# Patient Record
Sex: FEMALE | Race: WHITE
[De-identification: names, ages, dates, MRNs, and addresses within clinical notes are randomized per-mention and may not be internally consistent; named-entity substitution may affect disease eponyms.]

---

## 2018-11-09 ENCOUNTER — HOSPITAL ENCOUNTER (OUTPATIENT)
Dept: HOSPITAL 92 - SCSRAD | Age: 63
Discharge: HOME | End: 2018-11-09
Attending: INTERNAL MEDICINE
Payer: OTHER GOVERNMENT

## 2018-11-09 ENCOUNTER — HOSPITAL ENCOUNTER (INPATIENT)
Dept: HOSPITAL 92 - SCSER | Age: 63
LOS: 3 days | Discharge: HOME | DRG: 871 | End: 2018-11-12
Attending: FAMILY MEDICINE | Admitting: FAMILY MEDICINE
Payer: OTHER GOVERNMENT

## 2018-11-09 VITALS — BODY MASS INDEX: 28.3 KG/M2

## 2018-11-09 DIAGNOSIS — E11.9: ICD-10-CM

## 2018-11-09 DIAGNOSIS — Z79.52: ICD-10-CM

## 2018-11-09 DIAGNOSIS — E78.2: ICD-10-CM

## 2018-11-09 DIAGNOSIS — J98.4: ICD-10-CM

## 2018-11-09 DIAGNOSIS — E78.5: ICD-10-CM

## 2018-11-09 DIAGNOSIS — R05: Primary | ICD-10-CM

## 2018-11-09 DIAGNOSIS — D69.6: ICD-10-CM

## 2018-11-09 DIAGNOSIS — M06.9: ICD-10-CM

## 2018-11-09 DIAGNOSIS — J20.9: ICD-10-CM

## 2018-11-09 DIAGNOSIS — R65.20: ICD-10-CM

## 2018-11-09 DIAGNOSIS — H35.30: ICD-10-CM

## 2018-11-09 DIAGNOSIS — Z79.82: ICD-10-CM

## 2018-11-09 DIAGNOSIS — Z88.1: ICD-10-CM

## 2018-11-09 DIAGNOSIS — Z88.8: ICD-10-CM

## 2018-11-09 DIAGNOSIS — Z79.84: ICD-10-CM

## 2018-11-09 DIAGNOSIS — J96.01: ICD-10-CM

## 2018-11-09 DIAGNOSIS — Z79.899: ICD-10-CM

## 2018-11-09 DIAGNOSIS — A40.9: Primary | ICD-10-CM

## 2018-11-09 DIAGNOSIS — J15.4: ICD-10-CM

## 2018-11-09 DIAGNOSIS — Z87.891: ICD-10-CM

## 2018-11-09 DIAGNOSIS — Z79.51: ICD-10-CM

## 2018-11-09 LAB
ALBUMIN SERPL BCG-MCNC: 3.8 G/DL (ref 3.4–4.8)
ALP SERPL-CCNC: 54 U/L (ref 40–150)
ALT SERPL W P-5'-P-CCNC: 28 U/L (ref 8–55)
ANALYZER IN CARDIO: (no result)
ANION GAP SERPL CALC-SCNC: 17 MMOL/L (ref 10–20)
ANISOCYTOSIS BLD QL SMEAR: (no result) (100X)
AST SERPL-CCNC: 36 U/L (ref 5–34)
BASE EXCESS STD BLDA CALC-SCNC: -4.5 MEQ/L
BILIRUB SERPL-MCNC: 0.7 MG/DL (ref 0.2–1.2)
BUN SERPL-MCNC: 14 MG/DL (ref 9.8–20.1)
CA-I BLDA-SCNC: 1.21 MMOL/L (ref 1.12–1.3)
CALCIUM SERPL-MCNC: 9.9 MG/DL (ref 7.8–10.44)
CHLORIDE SERPL-SCNC: 100 MMOL/L (ref 98–107)
CO2 SERPL-SCNC: 19 MMOL/L (ref 23–31)
CREAT CL PREDICTED SERPL C-G-VRATE: 0 ML/MIN (ref 70–130)
GLOBULIN SER CALC-MCNC: 3.2 G/DL (ref 2.4–3.5)
GLUCOSE SERPL-MCNC: 175 MG/DL (ref 80–115)
HCO3 BLDA-SCNC: 19.8 MEQ/L (ref 22–28)
HCT VFR BLDA CALC: 40 % (ref 36–47)
HGB BLD-MCNC: 13.9 G/DL (ref 12–16)
HGB BLDA-MCNC: 13.5 G/DL (ref 12–16)
MCH RBC QN AUTO: 31 PG (ref 27–31)
MCV RBC AUTO: 91.4 FL (ref 78–98)
MDIFF COMPLETE?: YES
PCO2 BLDA: 34.4 MMHG (ref 35–45)
PH BLDA: 7.38 [PH] (ref 7.35–7.45)
PLATELET # BLD AUTO: 94 THOU/UL (ref 130–400)
PLATELET BLD QL SMEAR: (no result)
PO2 BLDA: 69.5 MMHG (ref 80–?)
POTASSIUM BLD-SCNC: 4.33 MMOL/L (ref 3.7–5.3)
POTASSIUM SERPL-SCNC: 4.4 MMOL/L (ref 3.5–5.1)
RBC # BLD AUTO: 4.49 MILL/UL (ref 4.2–5.4)
REFLEX FOR REVIEW??: YES
SODIUM SERPL-SCNC: 132 MMOL/L (ref 136–145)
SPECIMEN DRAWN FROM PATIENT: (no result)
WBC # BLD AUTO: 8 THOU/UL (ref 4.8–10.8)

## 2018-11-09 PROCEDURE — A4216 STERILE WATER/SALINE, 10 ML: HCPCS

## 2018-11-09 PROCEDURE — 36416 COLLJ CAPILLARY BLOOD SPEC: CPT

## 2018-11-09 PROCEDURE — 94660 CPAP INITIATION&MGMT: CPT

## 2018-11-09 PROCEDURE — 85025 COMPLETE CBC W/AUTO DIFF WBC: CPT

## 2018-11-09 PROCEDURE — 83880 ASSAY OF NATRIURETIC PEPTIDE: CPT

## 2018-11-09 PROCEDURE — 80053 COMPREHEN METABOLIC PANEL: CPT

## 2018-11-09 PROCEDURE — 94760 N-INVAS EAR/PLS OXIMETRY 1: CPT

## 2018-11-09 PROCEDURE — 85060 BLOOD SMEAR INTERPRETATION: CPT

## 2018-11-09 PROCEDURE — 71045 X-RAY EXAM CHEST 1 VIEW: CPT

## 2018-11-09 PROCEDURE — 5A09357 ASSISTANCE WITH RESPIRATORY VENTILATION, LESS THAN 24 CONSECUTIVE HOURS, CONTINUOUS POSITIVE AIRWAY PRESSURE: ICD-10-PCS | Performed by: FAMILY MEDICINE

## 2018-11-09 PROCEDURE — 96361 HYDRATE IV INFUSION ADD-ON: CPT

## 2018-11-09 PROCEDURE — 96375 TX/PRO/DX INJ NEW DRUG ADDON: CPT

## 2018-11-09 PROCEDURE — 82805 BLOOD GASES W/O2 SATURATION: CPT

## 2018-11-09 PROCEDURE — 87040 BLOOD CULTURE FOR BACTERIA: CPT

## 2018-11-09 PROCEDURE — 83605 ASSAY OF LACTIC ACID: CPT

## 2018-11-09 PROCEDURE — 96367 TX/PROPH/DG ADDL SEQ IV INF: CPT

## 2018-11-09 PROCEDURE — 87633 RESP VIRUS 12-25 TARGETS: CPT

## 2018-11-09 PROCEDURE — 71046 X-RAY EXAM CHEST 2 VIEWS: CPT

## 2018-11-09 PROCEDURE — 36415 COLL VENOUS BLD VENIPUNCTURE: CPT

## 2018-11-09 PROCEDURE — 93005 ELECTROCARDIOGRAM TRACING: CPT

## 2018-11-09 PROCEDURE — 94640 AIRWAY INHALATION TREATMENT: CPT

## 2018-11-09 PROCEDURE — 96365 THER/PROPH/DIAG IV INF INIT: CPT

## 2018-11-09 PROCEDURE — 80048 BASIC METABOLIC PNL TOTAL CA: CPT

## 2018-11-09 NOTE — HP
DATE OF ADMISSION:  11/09/2018

 

PRIMARY CARE PROVIDER:  Liliam Choi M.D.

 

CHIEF COMPLAINT:  Shortness of breath.

 

HISTORY OF PRESENT ILLNESS:  Ms. Byrne is a pleasant 63-year-old lady who was seen at [facility] on 11/09/2018 following transfer from [facility] emergency room.

 

She reports that she developed shortness of breath for the last 3 days.  The shortness of breath is a
ssociated with cough, congestion and temperature of 101 degrees Fahrenheit.  She also reports having 
headache.  She reports that the cough started being productive as of yesterday, but she is unable to 
bring the sputum up.  She saw her primary care provider 3 days ago and was started on Augmentin.  She
 continued to have symptoms and therefore had chest x-ray earlier today.  The chest x-ray was consist
ent with multifocal pneumonia.  Therefore, she presented to the emergency room.  She reports that she
 had a flu vaccine this year.  She also had a negative flu test when she was initially seen by her [facility] care provider.

 

REVIEW OF SYSTEMS:  All other systems reviewed and found to be negative.

 

PAST MEDICAL HISTORY:  Diabetes mellitus type 2, macular degeneration, dyslipidemia, hypertension and
 rheumatoid arthritis.

 

PAST SURGICAL HISTORY:  Bilateral cataract surgery, bladder incontinence repair and right knee surger
y.

 

SOCIAL HISTORY:  The patient quit smoking 13 years ago.  She denies any alcohol use or recreational d
rug use.

 

FAMILY HISTORY:  Significant for coronary artery disease in her brother.

 

ALLERGIES:  No known drug allergies.

 

CURRENT MEDICATIONS:  The patient reports that she was advised to stop methotrexate and Enbrel when h
er symptoms started.  She is currently taking vitamin C 500 mg daily, aspirin 81 mg daily, Coreg 3.25
 mg 2 times a day, cetirizine 10 mg daily, vitamin D3 1000 units daily, fish oil 4 capsules daily, fo
lic acid 1 mg daily, lisinopril 10 mg daily, metformin 1000 mg 2 times a day and prednisone 7.5 mg 2 
times a day, she is currently on a prednisone taper.

 

PHYSICAL EXAMINATION:

GENERAL:  Ms. Byrne is awake and alert, not in acute distress.

VITAL SIGNS:  Blood pressure is 147/68, pulse is 85, respiratory rate 32 and oxygen saturation 98% on
 BiPAP.  She is afebrile.

EYES:  No scleral icterus.  No conjunctival pallor.

ENT:  Moist mucosal membranes.  No oropharyngeal erythema or exudate.

NECK:  Supple, nontender.  Trachea is midline.

RESPIRATORY:  Accessory muscles of breathing are active.  Chest wall movements are symmetric bilatera
lly.  Lungs examination reveals bilateral crackles.

CARDIOVASCULAR:  S1 and S2 are heard, regular.  Peripheral pulses palpable.  No carotid bruit, no per
icardial rub.

ABDOMEN:  Soft, nontender, bowel sounds are heard.  No hepatomegaly, no splenomegaly.

NEUROLOGIC:  Cranial nerves II-XII intact.  Deep tendon reflexes 2+.

MUSCULOSKELETAL:  Power is 5/5 in all 4 extremities.  She has rheumatoid changes in her fingers.

SKIN:  No rashes or subcutaneous nodules.

LYMPHATIC:  No cervical lymphadenopathy.

 

LABORATORY DATA:  Ms. Byrne's labs and investigations were reviewed.  I reviewed her electrocardiogra
m, which shows normal sinus rhythm, no ST changes to suggest an acute coronary syndrome.  I also revi
ewed her chest x-ray, which shows multifocal infiltrates.  She has a normal white count of 8000, but 
bandemia with 22% bands and 38% neutrophils.  Hemoglobin is normal.  She has thrombocytopenia with a 
platelet count of 94,000, it was 130,000 on 10/03/2018.  She is hyponatremic, with sodium of 132, nor
mal potassium, normal creatinine, mildly elevated AST of 36, otherwise unremarkable liver profile.  L
actic acid initially elevated at 2.6 and subsequently trending down into the normal range at 1.5.

 

ASSESSMENT AND PLAN:  Ms. Byrne is a pleasant 63-year-old lady who was seen at [facility] on 11/09/2018.  Her problem list includes:

1.  Acute hypoxic respiratory failure:  Ms. Byrne was initially evaluated at [facility]
ergency room for acute hypoxic respiratory failure.  There, she became hypoxic after receiving steroi
ds and nebulizers.  She is currently on BiPAP therapy in the IMCU.  She has slightly improved since c
oming to the IM.

2.  Sepsis:  The patient's presentation meets the criteria for sepsis, suspected source of infection 
in the lung.  She will be treated with intravenous antibiotics in the IMCU setting.

3.  Pneumonia:  The patient is an immunocompromised patient presenting with multifocal pneumonia.  Sh
e will be started on vancomycin, levofloxacin and cefepime.  We will await blood cultures.

4.  Diabetes mellitus type 2:  We will start Accu-Cheks and insulin sliding scale.

5.  Hypertension:  We will monitor vital signs and titrate antihypertensives as needed.

6.  Rheumatoid arthritis:  Enbrel and methotrexate are on hold.  We will continue prednisone.  If blo
od pressure drops, we will consider stress dose steroids.

7.  Dyslipidemia:  We will continue fish oil.

 

Many thanks for allowing me to participate in your patient's care.  Please feel free to contact me wi
th any questions or concerns.

 

LEVEL OF RISK:  High.

 

LEVEL OF COMPLEXITY:  High.

## 2018-11-09 NOTE — RAD
CHEST 2 VIEWS:

 

Date:  11/09/18 

 

HISTORY:  

Cough. 

 

COMPARISON:  

Chest radiograph from 2012. 

 

FINDINGS:

There are extensive air space opacities throughout the lungs bilaterally. No pneumothorax. No signifi
cant effusion. Heart size upper limits of normal. 

 

IMPRESSION: 

Extensive air space opacities throughout the lungs concerning for multifocal infection. Atypical infe
ctious process is a possibility versus ARDS/hemorrhage. 

 

 

POS: [facility]

## 2018-11-10 LAB
ANION GAP SERPL CALC-SCNC: 12 MMOL/L (ref 10–20)
BUN SERPL-MCNC: 14 MG/DL (ref 9.8–20.1)
CALCIUM SERPL-MCNC: 9.6 MG/DL (ref 7.8–10.44)
CHLORIDE SERPL-SCNC: 105 MMOL/L (ref 98–107)
CO2 SERPL-SCNC: 24 MMOL/L (ref 23–31)
CREAT CL PREDICTED SERPL C-G-VRATE: 87 ML/MIN (ref 70–130)
GLUCOSE SERPL-MCNC: 244 MG/DL (ref 80–115)
HGB BLD-MCNC: 13.2 G/DL (ref 12–16)
MCH RBC QN AUTO: 32 PG (ref 27–31)
MCV RBC AUTO: 96.2 FL (ref 78–98)
MDIFF COMPLETE?: YES
PLATELET # BLD AUTO: 99 THOU/UL (ref 130–400)
PLATELET BLD QL SMEAR: (no result)
POTASSIUM SERPL-SCNC: 4.2 MMOL/L (ref 3.5–5.1)
RBC # BLD AUTO: 4.12 MILL/UL (ref 4.2–5.4)
SODIUM SERPL-SCNC: 137 MMOL/L (ref 136–145)
WBC # BLD AUTO: 4 THOU/UL (ref 4.8–10.8)

## 2018-11-10 RX ADMIN — Medication SCH MG: at 08:10

## 2018-11-10 RX ADMIN — VANCOMYCIN HYDROCHLORIDE SCH MLS: 1 INJECTION, SOLUTION INTRAVENOUS at 10:33

## 2018-11-10 RX ADMIN — INSULIN LISPRO PRN UNIT: 100 INJECTION, SOLUTION INTRAVENOUS; SUBCUTANEOUS at 20:45

## 2018-11-10 RX ADMIN — INSULIN LISPRO PRN UNIT: 100 INJECTION, SOLUTION INTRAVENOUS; SUBCUTANEOUS at 16:51

## 2018-11-10 RX ADMIN — ASPIRIN SCH MG: 81 TABLET ORAL at 08:09

## 2018-11-10 RX ADMIN — VANCOMYCIN HYDROCHLORIDE SCH MLS: 1 INJECTION, SOLUTION INTRAVENOUS at 00:26

## 2018-11-10 NOTE — PDOC.PN
- Subjective


Encounter Start Date: 11/10/18


Encounter Start Time: 08:50


-: old records requested/rev





pt feels better, she is off bipap since 8 AM, maintaining saturation with oxygen

, 


Patient seen and examined. No new complaints. No overnight events





- Objective


MAR Reviewed: Yes


Vital Signs & Weight: 


 Vital Signs (12 hours)











  Temp Pulse Resp BP Pulse Ox


 


 11/10/18 07:40      95


 


 11/10/18 07:13      95


 


 11/10/18 07:00  97.8 F  89  35 H  129/53 L  96


 


 11/10/18 04:29    22 H  


 


 11/10/18 03:00  97.4 F L  84  22 H  139/61  99


 


 11/10/18 02:35    24 H   96


 


 11/09/18 23:46  97.4 F L  85  26 H  140/55 L  97


 


 11/09/18 23:25   91  34 H   95


 


 11/09/18 23:10    35 H   98








 Weight











Weight                         149 lb 9.6 oz














I&O: 


 











 11/09/18 11/10/18 11/11/18





 06:59 06:59 06:59


 


Intake Total  650 


 


Output Total  2320 


 


Balance  -1670 











Result Diagrams: 


 11/10/18 03:22





 11/10/18 03:22


Additional Labs: 


 Accuchecks











  11/10/18





  06:16


 


POC Glucose  174 H











Radiology Reviewed by me: Yes (chest xray reviewed)


EKG Reviewed by me: Yes (nsr)





Phys Exam





- Physical Examination


Constitutional: NAD


HEENT: PERRLA, moist MMs, sclera anicteric


Neck: no JVD, supple


Respiratory: no wheezing, no rhonchi


scattered rales+


Cardiovascular: RRR, no significant murmur, no rub


Gastrointestinal: soft, non-tender, no distention, positive bowel sounds


Musculoskeletal: no edema, pulses present


Neurological: non-focal, normal sensation, moves all 4 limbs


Psychiatric: normal affect, A&O x 3


Skin: no rash, normal turgor





Dx/Plan


(1) Acute respiratory failure with hypoxia


Code(s): J96.01 - ACUTE RESPIRATORY FAILURE WITH HYPOXIA   Status: Acute   





(2) Lactic acidosis


Code(s): E87.2 - ACIDOSIS   Status: Acute   Comment: due to sepsis   





(3) Multifocal pneumonia


Code(s): J18.9 - PNEUMONIA, UNSPECIFIED ORGANISM   Status: Acute   Comment: 

suspecting GPC and GNR   





(4) Sepsis with acute organ dysfunction


Code(s): A41.9 - SEPSIS, UNSPECIFIED ORGANISM; R65.20 - SEVERE SEPSIS WITHOUT 

SEPTIC SHOCK   Status: Acute   





(5) Thrombocytopenia


Code(s): D69.6 - THROMBOCYTOPENIA, UNSPECIFIED   Status: Acute   





(6) Arthritis, rheumatoid


Code(s): M06.9 - RHEUMATOID ARTHRITIS, UNSPECIFIED   Status: Chronic   





(7) Diabetes type 2, controlled


Code(s): E11.9 - TYPE 2 DIABETES MELLITUS WITHOUT COMPLICATIONS   Status: 

Chronic   





(8) Hypertension


Code(s): I10 - ESSENTIAL (PRIMARY) HYPERTENSION   Status: Chronic   





(9) Immunosuppressed status


Code(s): D89.9 - DISORDER INVOLVING THE IMMUNE MECHANISM, UNSPECIFIED   Status: 

Chronic   





- Plan


cont current plan of care, plan discussed w/ family, continue antibiotics, 

respiratory therapy





* continue Bipap as needed


* continue vancomycin, cefepime and levaquin


* home medication reconciled


* medication reviewed as below


* symptomatic treatment.


* monitor in IMCU


* discussed with family


* repeat labs tomorrow


* pulmonary on case








Review of Systems





- Review of Systems


Constitutional: weakness.  negative: fever, chills, sweats, malaise, other


Respiratory: Cough, Shortness of Breath.  negative: Dry, Hemoptysis, SOB with 

Excertion, Pleuritic Pain, Sputum, Wheezing


Cardiovascular: negative: chest pain, palpitations, orthopnea, paroxysmal 

nocturnal dyspnea, edema, light headedness, other


Gastrointestinal: negative: Nausea, Vomiting, Abdominal Pain, Diarrhea, 

Constipation, Melena, Hematochezia, Other


Genitourinary: negative: Dysuria, Frequency, Incontinence, Hematuria, Retention

, Other


Musculoskeletal: negative: Neck Pain, Shoulder Pain, Arm Pain, Back Pain, Hand 

Pain, Leg Pain, Foot Pain, Other


Skin: negative: Rash, Lesions, Efrain, Bruising, Other





- Medications/Allergies


Allergies/Adverse Reactions: 


 Allergies











Allergy/AdvReac Type Severity Reaction Status Date / Time


 


No Known Allergies Allergy   Verified 11/09/18 21:04











Medications: 


 Current Medications





Acetaminophen (Tylenol)  650 mg PO Q4H PRN


   PRN Reason: Headache/Fever/Mild Pain (1-3)


Albuterol/Ipratropium (Duoneb)  3 ml NEB Q6H PRN


   PRN Reason: SOB &/or Wheezing


Artificial Tears (Tears Naturale)  2 drop EA EYE PRN PRN


   PRN Reason: Dry Eyes


Ascorbic Acid (Vitamin C)  500 mg PO DAILY [facility]


   Last Admin: 11/10/18 08:10 Dose:  500 mg


Aspirin (Ecotrin)  81 mg PO DAILY [facility]


   Last Admin: 11/10/18 08:09 Dose:  81 mg


Bisacodyl (Dulcolax)  10 mg PO DAILYPRN PRN


   PRN Reason: Constipation


Carvedilol (Coreg)  3.125 mg PO BID-[facility]


   Last Admin: 11/10/18 08:09 Dose:  3.125 mg


Cholecalciferol (Vitamin D3)  1,000 units PO DAILY [facility]


   Last Admin: 11/10/18 08:10 Dose:  1,000 units


Dextrose/Water (Dextrose 50%)  25 gm SLOW IVP PRN PRN


   PRN Reason: Hypoglycemia


Enoxaparin Sodium (Lovenox)  40 mg SC 0900 [facility]


   Last Admin: 11/10/18 08:08 Dose:  40 mg


Fish Oil (Fish Oil)  4,000 mg PO QAM-[facility]


   Last Admin: 11/10/18 08:13 Dose:  Not Given


Folic Acid (Folvite)  1 mg PO DAILY [facility]


   Last Admin: 11/10/18 08:11 Dose:  1 mg


Glucagon (Glucagon)  1 mg IM PRN PRN


   PRN Reason: Hypoglycemia


Guaifenesin (Robitussin Sf)  200 mg PO Q4H PRN


   PRN Reason: Cough


Hydralazine HCl (Apresoline)  10 mg SLOW IVP Q4H PRN


   PRN Reason: SBP > 180 and HR < 70


Cefepime HCl 2 gm/ Sodium (Chloride)  100 mls @ 200 mls/hr IVPB Q12HR [facility]


   Last Admin: 11/10/18 08:08 Dose:  100 mls


Levofloxacin 750 mg/ Device  150 mls @ 100 mls/hr IVPB Q24HR [facility]


   Last Admin: 11/09/18 23:07 Dose:  150 mls


Dextrose/Water (D5w)  1,000 mls @ 0 mls/hr IV .Q0M PRN


   PRN Reason: Hypoglycemia


Vancomycin HCl 1 gm/ Device  200 mls @ 200 mls/hr IVPB 1100,2300 [facility]


   Last Admin: 11/10/18 00:26 Dose:  200 mls


Insulin Human Lispro (Humalog)  0 units SC .MILD SLIDING SCALE PRN


   PRN Reason: Mild Correctional Scale


Lisinopril (Zestril)  10 mg PO DAILY [facility]


   Last Admin: 11/10/18 08:09 Dose:  10 mg


Loperamide HCl (Imodium)  2 mg PO PRN PRN


   PRN Reason: Diarrhea/Loose Stools


Loratadine (Claritin)  10 mg PO DAILY [facility]


   Last Admin: 11/10/18 08:10 Dose:  10 mg


Metformin HCl (Glucophage)  1,000 mg PO BID-[facility]


   Last Admin: 11/10/18 08:11 Dose:  1,000 mg


Metoclopramide HCl (Reglan)  5 mg IVP Q4H PRN


   PRN Reason: Nausea


Mineral Oil/White Petrolatum (Eucerin Cream)  0 gm TOP BIDPRN PRN


   PRN Reason: Dry Skin


Miscellaneous Medication (Pharmacy To Dose)  1 each IVPB ONE PRN


   PRN Reason: Pharmacy to dose


   Stop: 11/19/18 22:06


Prednisone (Prednisone)  7.5 mg PO BID-[facility]


   Last Admin: 11/10/18 08:10 Dose:  7.5 mg


Senna/Docusate Sodium (Senokot S)  2 tab PO BID PRN


   PRN Reason: Constipation


Sodium Chloride (Ocean Nasal Spray 0.65%)  0 ml EA NARE QIDPRN PRN


   PRN Reason: Nasal Congestion


Throat Lozenges (Cepastat Lozenges)  1 piyush PO Q2H PRN


   PRN Reason: Sore Throat

## 2018-11-10 NOTE — CON
DATE OF CONSULTATION:  11/10/2018

 

SERVICE:  Pulmonary Medicine.

 

REASON FOR CONSULTATION:  Respiratory failure.

 

HISTORY OF PRESENT ILLNESS:  The patient is a 63-year-old white female with no 
past medical history that is significant.  She was in her usual state of health 
until Tuesday.  She started having symptoms consistent with sinusitis.  She got 
seen by her primary care physician, who put her on some Augmentin.  That being 
said, over the next 3 days, she had increasing dyspnea that limited her 
activity.  She presented to the emergency department, because she could not 
breathe.  Ultimately, she was discovered to have findings that could be 
consistent with pneumonia.  She had cough, sputum production, fevers, chills, 
myalgia, malaise.  She did not have any nausea, vomiting, or diarrhea.  She has 
no hot, red, swollen joints, rashes, dysuria, or frequency.  Otherwise, she is 
in her usual state of health.  Over the last 24 hours, her breathing has opened 
up very dramatically.

 

PAST MEDICAL HISTORY:

1.  Rheumatoid arthritis, on disease modifying therapy.

2.  Type 2 diabetes mellitus.

3.  Macular degeneration.

4.  Dyslipidemia.

5.  Hypertension.

 

PAST SURGICAL HISTORY:

1.  Bladder suspension surgery.

2.  Right knee surgery.

3.  Bilateral cataract surgery.

 

SOCIAL HISTORY:  She quit smoking 13 years ago, prior to that had a 30-pack-
year history of smoking.  Denies any alcohol or illicit drug use.  She has no 
exposure to chemicals, dust, asbestos, or tuberculosis.

 

FAMILY HISTORY:  Noncontributory.

 

ALLERGIES:  No known drug allergies.

 

MEDICATIONS:  List of her inpatient medications were reviewed and heavily 
modified.

 

REVIEW OF SYSTEMS:  General, head, ears, eyes, nose, throat, cardiovascular, 
respiratory, GI, , musculoskeletal, neurologic, and skin is negative except 
as mentioned in the HPI.

 

PHYSICAL EXAMINATION:

VITAL SIGNS:  Afebrile in our location.  Apparently, at the outside emergency 
department, she had a temperature of 102.  Pulse 91, blood pressure 130/62, 
respirations 18, saturation 93% on 4 liters nasal cannula.

GENERAL:  Patient is awake, alert, in no apparent distress.

LUNGS:  Excellent air entry.  There are asymmetric crackles present.  There is 
no prolonged expiratory phase, wheezing, or rhonchi appreciated.

HEART:  Normal rate, regular.

ABDOMEN:  Soft, nontender, nondistended.  Bowel sounds are positive.

MUSCULOSKELETAL:  No cyanosis or clubbing.  No pitting in the bilateral lower 
extremities.

NEUROLOGIC:  Grossly nonfocal.

 

LABORATORY DATA:  WBC 4.0, hemoglobin 13.2, platelets 99,000.  Her band count 
was originally 22%, but has improved to 5%.  PH 7.38, pCO2 of 34, pO2 of 69.  
This corresponds to a saturation of 93% when she was wearing BiPAP with 70% 
FiO2.  Basic metabolic profile and liver function studies are essentially 
unremarkable.  BNP 85.  Lactate is negative.  Blood cultures x2 are 
unremarkable.

 

IMAGING:  Chest x-ray demonstrates pulmonary edema spread throughout bilateral 
lung fields.  A widened carinal angle is present.  That being said, there seems 
to be sparing of the bibasilar regions.  This is consistent with a 
noncardiogenic form of pulmonary edema.  Interstitial fullness is prominent.

 

ASSESSMENT:

1.  Acute hypoxic respiratory failure.

2.  Acute bronchitis.

3.  Community-acquired pneumonia.

4.  Rheumatoid arthritis, on disease modifying, immune suppressing medications.

 

DISCUSSION AND PLAN:  We will continue our antibiotics, nebulized medications, 
and steroids.  We will back off on her dose of steroids to 40 mg p.o. daily.  
She has been off of BiPAP for 12 hours now.  As such, we will transition her 
out of the ICU to the medical unit.  I will send a respiratory virus panel to 
see whether or not this is the source of our noncardiogenic pulmonary edema 
pattern on chest x-ray.  In 2 days, repeat chest x-ray will be performed.  Of 
note, hemoglobin has been stable.

 

70 minutes have been devoted to this patient in various activities.  I 
personally reviewed all imaging studies and laboratory data noted within this 
document.  For fifty percent of this time, I was interacting with the patient 
at the bedside or coordinating care with the care team.  For the remainder of 
the time I was immediately available to the patient in the hospital unit.  



YANIRA

## 2018-11-11 LAB
ALBUMIN SERPL BCG-MCNC: 3.5 G/DL (ref 3.4–4.8)
ALP SERPL-CCNC: 54 U/L (ref 40–150)
ALT SERPL W P-5'-P-CCNC: 19 U/L (ref 8–55)
ANION GAP SERPL CALC-SCNC: 13 MMOL/L (ref 10–20)
AST SERPL-CCNC: 51 U/L (ref 5–34)
BILIRUB SERPL-MCNC: 0.5 MG/DL (ref 0.2–1.2)
BUN SERPL-MCNC: 15 MG/DL (ref 9.8–20.1)
CALCIUM SERPL-MCNC: 10 MG/DL (ref 7.8–10.44)
CHLORIDE SERPL-SCNC: 104 MMOL/L (ref 98–107)
CO2 SERPL-SCNC: 24 MMOL/L (ref 23–31)
CREAT CL PREDICTED SERPL C-G-VRATE: 92 ML/MIN (ref 70–130)
GLOBULIN SER CALC-MCNC: 2.8 G/DL (ref 2.4–3.5)
GLUCOSE SERPL-MCNC: 127 MG/DL (ref 80–115)
HGB BLD-MCNC: 12.3 G/DL (ref 12–16)
MANUAL DIF COMMENT BLD-IMP: (no result)
MCH RBC QN AUTO: 32 PG (ref 27–31)
MCV RBC AUTO: 97.4 FL (ref 78–98)
MDIFF COMPLETE?: YES
PLATELET # BLD AUTO: 134 THOU/UL (ref 130–400)
POTASSIUM SERPL-SCNC: 3.9 MMOL/L (ref 3.5–5.1)
RBC # BLD AUTO: 3.83 MILL/UL (ref 4.2–5.4)
SODIUM SERPL-SCNC: 137 MMOL/L (ref 136–145)
WBC # BLD AUTO: 14 THOU/UL (ref 4.8–10.8)

## 2018-11-11 RX ADMIN — Medication SCH MG: at 08:16

## 2018-11-11 RX ADMIN — INSULIN LISPRO PRN UNIT: 100 INJECTION, SOLUTION INTRAVENOUS; SUBCUTANEOUS at 12:06

## 2018-11-11 RX ADMIN — ASPIRIN SCH MG: 81 TABLET ORAL at 08:16

## 2018-11-11 RX ADMIN — INSULIN LISPRO PRN UNIT: 100 INJECTION, SOLUTION INTRAVENOUS; SUBCUTANEOUS at 17:06

## 2018-11-11 NOTE — PRG
DATE OF SERVICE:  11/11/2018

 

SERVICE:  Pulmonary Medicine.

 

INTERVAL HISTORY:  The patient is doing great from a respiratory standpoint.  Breathing comfortably. 
 No chest pain, nausea, vomiting, fevers or chills.  Otherwise, she has actually made significant imp
rovements.  She is able to walk around the unit today.  She is on 2 liters nasal cannula, which is si
gnificant improvement.  Overall, she feels much improved and is happy with the direction she is headi
ng.

 

PHYSICAL EXAMINATION:

VITAL SIGNS:  Afebrile, pulse 83, blood pressure 118/70, respirations 20, saturation 92% on 3 liters 
nasal cannula.

GENERAL:  The patient is awake and alert, in no apparent distress.

LUNGS:  Much improved air entry.  There are no crackles or rhonchi today.  No wheezing is appreciated
.  There is no prolonged expiratory phase.

HEART:  Normal rate, regular.

ABDOMEN:  Soft, nontender, nondistended.  Bowel sounds are positive.

MUSCULOSKELETAL:  No cyanosis or clubbing.  There is no pitting in the bilateral lower extremities.

NEUROLOGIC:  Grossly nonfocal.

 

LABORATORY DATA:  WBC 14.0, hemoglobin 12.3, platelets 134,000.  PH 7.38, pCO2 of 34, pO2 of 69.  Bas
ic metabolic profile and liver function studies are otherwise unremarkable.  Lactate was previously n
egative.  Respiratory virus panel is positive for rhinovirus.  Blood cultures x2 remain negative.

 

IMAGING:  Chest x-ray demonstrates interval improvement in the opacification of the bilateral lung fi
elds.  This is a slightly underpenetrated film given the appearance of diffuse consolidating changes.


 

ASSESSMENT:

1.  Acute hypoxic respiratory failure.

2.  Acute bronchitis secondary to rhinovirus.

3.  Community-acquired pneumonia secondary to a viral disease.

4.  Rheumatoid arthritis, on disease modifying, immunosuppressing medications.

 

DISCUSSION AND PLAN:  We will continue her steroids and antibiotics.  Everything can be converted ove
r to p.o.  I will repeat a 2-view of the chest x-ray tomorrow morning.  From my perspective, when she
 is off oxygen, she can be considered for transition out of the hospital.  She will need 5 days of an
tibiotic and steroids in total.

## 2018-11-11 NOTE — RAD
CHEST 1 VIEW:

 

Date:  11/11/18 

 

HISTORY:  

Infiltrates. 

 

COMPARISON:  

Radiograph from 11/09/18. 

 

FINDINGS:

There is extensive ground-glass opacity throughout the lungs. This is increased confluence compared t
o the comparison examination. Likely small effusions. No pneumothorax. 

 

IMPRESSION: 

Severe opacities throughout the lungs, which are more confluent than the prior examination suggesting
 multifocal pneumonia, ARDS, or hemorrhage. 

 

 

POS: [facility]

## 2018-11-11 NOTE — PDOC.PN
- Subjective


Encounter Start Date: 11/11/18


Encounter Start Time: 08:20





Patient seen and examined. pt still need oxygen, clinically she feels better. 

No overnight events





- Objective


Resuscitation Status: 


 











Resuscitation Status           FULL:Full Resuscitation














MAR Reviewed: Yes


Vital Signs & Weight: 


 Vital Signs (12 hours)











  Temp Pulse Resp BP Pulse Ox


 


 11/11/18 08:00      95


 


 11/11/18 07:28  98.0 F  88  20  160/84 H  95


 


 11/11/18 05:36      95


 


 11/11/18 05:11  98.3 F  92  18  136/79  95








 Weight











Admit Weight                   149 lb 9.6 oz


 


Weight                         149 lb 9.6 oz














I&O: 


 











 11/10/18 11/11/18 11/12/18





 06:59 06:59 06:59


 


Intake Total 650 1580 


 


Output Total 2320 2100 


 


Balance -1670 -520 











Result Diagrams: 


 11/11/18 04:18





 11/11/18 04:18


Additional Labs: 


 Accuchecks











  11/11/18 11/10/18 11/10/18





  05:03 20:43 16:36


 


POC Glucose  128 H  244 H  217 H














  11/10/18





  10:32


 


POC Glucose  187 H











Radiology Reviewed by me: Yes (chest xray reviewed)





Phys Exam





- Physical Examination


Constitutional: NAD


HEENT: PERRLA, moist MMs, sclera anicteric


Neck: no JVD, supple


Respiratory: no wheezing, no rhonchi


scattered rales+


Cardiovascular: RRR, no significant murmur, no rub


Gastrointestinal: soft, non-tender, no distention, positive bowel sounds


Musculoskeletal: no edema, pulses present


Neurological: non-focal, normal sensation, moves all 4 limbs


Psychiatric: normal affect, A&O x 3


Skin: no rash, normal turgor





Dx/Plan


(1) Acute respiratory failure with hypoxia


Code(s): J96.01 - ACUTE RESPIRATORY FAILURE WITH HYPOXIA   Status: Acute   





(2) Lactic acidosis


Code(s): E87.2 - ACIDOSIS   Status: Resolved   Comment: due to sepsis   





(3) Multifocal pneumonia


Code(s): J18.9 - PNEUMONIA, UNSPECIFIED ORGANISM   Status: Acute   Comment: 

suspecting GPC and GNR   





(4) Sepsis with acute organ dysfunction


Code(s): A41.9 - SEPSIS, UNSPECIFIED ORGANISM; R65.20 - SEVERE SEPSIS WITHOUT 

SEPTIC SHOCK   Status: Acute   





(5) Thrombocytopenia


Code(s): D69.6 - THROMBOCYTOPENIA, UNSPECIFIED   Status: Acute   





(6) Arthritis, rheumatoid


Code(s): M06.9 - RHEUMATOID ARTHRITIS, UNSPECIFIED   Status: Chronic   





(7) Diabetes type 2, controlled


Code(s): E11.9 - TYPE 2 DIABETES MELLITUS WITHOUT COMPLICATIONS   Status: 

Chronic   





(8) Hypertension


Code(s): I10 - ESSENTIAL (PRIMARY) HYPERTENSION   Status: Chronic   





(9) Immunosuppressed status


Code(s): D89.9 - DISORDER INVOLVING THE IMMUNE MECHANISM, UNSPECIFIED   Status: 

Chronic   





- Plan


cont current plan of care, plan discussed w/ family, continue antibiotics, 

respiratory therapy





* medication reviewed as below


* symptomatic treatment


* discussed with family


* continue IV levaquin


* continue oral prednisone


* titrate oxygen requirement.








Review of Systems





- Review of Systems


Constitutional: weakness.  negative: fever, chills, sweats, malaise, other


ENT: negative: Ear Pain, Ear Discharge, Nose Pain, Nose Discharge, Nose 

Congestion, Mouth Pain, Mouth Swelling, Throat Pain, Throat Swelling, Other


Respiratory: Cough, Shortness of Breath, SOB with Excertion.  negative: Dry, 

Hemoptysis, Pleuritic Pain, Sputum, Wheezing


Cardiovascular: negative: chest pain, palpitations, orthopnea, paroxysmal 

nocturnal dyspnea, edema, light headedness, other


Gastrointestinal: negative: Nausea, Vomiting, Abdominal Pain, Diarrhea, 

Constipation, Melena, Hematochezia, Other


Genitourinary: negative: Dysuria, Frequency, Incontinence, Hematuria, Retention

, Other


Musculoskeletal: negative: Neck Pain, Shoulder Pain, Arm Pain, Back Pain, Hand 

Pain, Leg Pain, Foot Pain, Other


Skin: negative: Rash, Lesions, Efrain, Bruising, Other





- Medications/Allergies


Allergies/Adverse Reactions: 


 Allergies











Allergy/AdvReac Type Severity Reaction Status Date / Time


 


No Known Allergies Allergy   Verified 11/09/18 21:04











Medications: 


 Current Medications





Acetaminophen (Tylenol)  650 mg PO Q4H PRN


   PRN Reason: Headache/Fever/Mild Pain (1-3)


Albuterol/Ipratropium (Duoneb)  3 ml NEB Q6H PRN


   PRN Reason: SOB &/or Wheezing


Artificial Tears (Tears Naturale)  2 drop EA EYE PRN PRN


   PRN Reason: Dry Eyes


Ascorbic Acid (Vitamin C)  500 mg PO DAILY [facility]


   Last Admin: 11/11/18 08:16 Dose:  500 mg


Aspirin (Ecotrin)  81 mg PO DAILY [facility]


   Last Admin: 11/11/18 08:16 Dose:  81 mg


Bisacodyl (Dulcolax)  10 mg PO DAILYPRN PRN


   PRN Reason: Constipation


Carvedilol (Coreg)  3.125 mg PO BID-[facility]


   Last Admin: 11/11/18 08:16 Dose:  3.125 mg


Cholecalciferol (Vitamin D3)  1,000 units PO DAILY [facility]


   Last Admin: 11/11/18 08:17 Dose:  1,000 units


Dextrose/Water (Dextrose 50%)  25 gm SLOW IVP PRN PRN


   PRN Reason: Hypoglycemia


Enoxaparin Sodium (Lovenox)  40 mg SC 0900 [facility]


   Last Admin: 11/11/18 08:17 Dose:  40 mg


Fish Oil (Fish Oil)  4,000 mg PO QAM-[facility]


   Last Admin: 11/11/18 08:17 Dose:  Not Given


Folic Acid (Folvite)  1 mg PO DAILY [facility]


   Last Admin: 11/11/18 08:16 Dose:  Not Given


Glucagon (Glucagon)  1 mg IM PRN PRN


   PRN Reason: Hypoglycemia


Guaifenesin (Robitussin Sf)  200 mg PO Q4H PRN


   PRN Reason: Cough


Hydralazine HCl (Apresoline)  10 mg SLOW IVP Q4H PRN


   PRN Reason: SBP > 180 and HR < 70


Levofloxacin 750 mg/ Device  150 mls @ 100 mls/hr IVPB Q24HR [facility]


   Last Admin: 11/10/18 22:04 Dose:  150 mls


Dextrose/Water (D5w)  1,000 mls @ 0 mls/hr IV .Q0M PRN


   PRN Reason: Hypoglycemia


Insulin Human Lispro (Humalog)  0 units SC .MILD SLIDING SCALE PRN


   PRN Reason: Mild Correctional Scale


   Last Admin: 11/10/18 20:45 Dose:  3 unit


Lisinopril (Zestril)  10 mg PO DAILY [facility]


   Last Admin: 11/11/18 08:17 Dose:  10 mg


Loperamide HCl (Imodium)  2 mg PO PRN PRN


   PRN Reason: Diarrhea/Loose Stools


Loratadine (Claritin)  10 mg PO DAILY [facility]


   Last Admin: 11/11/18 08:16 Dose:  10 mg


Metformin HCl (Glucophage)  1,000 mg PO BID-[facility]


   Last Admin: 11/11/18 08:16 Dose:  1,000 mg


Metoclopramide HCl (Reglan)  5 mg IVP Q4H PRN


   PRN Reason: Nausea


Mineral Oil/White Petrolatum (Eucerin Cream)  0 gm TOP BIDPRN PRN


   PRN Reason: Dry Skin


Prednisone (Prednisone)  40 mg PO QA-[facility]


   Last Admin: 11/11/18 08:16 Dose:  40 mg


Senna/Docusate Sodium (Senokot S)  2 tab PO BID PRN


   PRN Reason: Constipation


Sodium Chloride (Ocean Nasal Spray 0.65%)  0 ml EA NARE QIDPRN PRN


   PRN Reason: Nasal Congestion


Throat Lozenges (Cepastat Lozenges)  1 piyush PO Q2H PRN


   PRN Reason: Sore Throat

## 2018-11-12 VITALS — SYSTOLIC BLOOD PRESSURE: 148 MMHG | TEMPERATURE: 97.9 F | DIASTOLIC BLOOD PRESSURE: 69 MMHG

## 2018-11-12 LAB
ANION GAP SERPL CALC-SCNC: 13 MMOL/L (ref 10–20)
BUN SERPL-MCNC: 16 MG/DL (ref 9.8–20.1)
CALCIUM SERPL-MCNC: 9.8 MG/DL (ref 7.8–10.44)
CHLORIDE SERPL-SCNC: 103 MMOL/L (ref 98–107)
CO2 SERPL-SCNC: 25 MMOL/L (ref 23–31)
CREAT CL PREDICTED SERPL C-G-VRATE: 86 ML/MIN (ref 70–130)
GLUCOSE SERPL-MCNC: 99 MG/DL (ref 80–115)
HGB BLD-MCNC: 12.4 G/DL (ref 12–16)
MCH RBC QN AUTO: 33.5 PG (ref 27–31)
MCV RBC AUTO: 96.6 FL (ref 78–98)
MDIFF COMPLETE?: YES
PLATELET # BLD AUTO: 138 THOU/UL (ref 130–400)
POTASSIUM SERPL-SCNC: 4 MMOL/L (ref 3.5–5.1)
RBC # BLD AUTO: 3.7 MILL/UL (ref 4.2–5.4)
SODIUM SERPL-SCNC: 137 MMOL/L (ref 136–145)
WBC # BLD AUTO: 10.8 THOU/UL (ref 4.8–10.8)

## 2018-11-12 RX ADMIN — Medication SCH MG: at 08:42

## 2018-11-12 RX ADMIN — ASPIRIN SCH MG: 81 TABLET ORAL at 08:41

## 2018-11-12 NOTE — PRG
DATE OF SERVICE:  11/12/2018

 

SERVICE:  Pulmonary Medicine.

 

INTERVAL HISTORY:  The patient is doing great from a respiratory standpoint.  She is breathing very c
omfortably.  She has no complaints of fevers, chills, nausea, vomiting or diarrhea.  She has been wal
faviola in the hallways on room air.  She has absolutely no complaints about her breathing.

 

PHYSICAL EXAMINATION:

VITAL SIGNS:  Afebrile, pulse 79, blood pressure 148/69, respirations 18, saturation 96% on room air.


GENERAL:  The patient is awake, alert, no apparent distress.

LUNGS:  Excellent air entry with no prolonged expiratory phase, wheezing, rhonchi or crackles.

HEART:  Normal rate, regular.

ABDOMEN:  Soft, nontender, nondistended.  Bowel sounds are positive.

MUSCULOSKELETAL:  No cyanosis or clubbing.  There is no pitting in the bilateral lower extremities.

NEUROLOGIC:  Grossly nonfocal.

 

LABORATORY DATA AND IMAGING DATA:  WBC 10.8, hemoglobin 12.4, platelets 138,000, and improved.  Basic
 metabolic profile is unremarkable.  Respiratory virus panel is positive for rhinovirus.  Two-view 
est x-ray demonstrates interval improvement in the diffuse bilateral pulmonary densities.

 

ASSESSMENT:

1.  Acute hypoxic respiratory failure.

2.  Acute bronchitis secondary to rhinovirus.

3.  Community-acquired pneumonia secondary to a viral disease.

4.  Rheumatoid arthritis, on disease modifying, immunosuppressing medications.

 

DISCUSSION AND PLAN:  The patient is doing absolutely wonderful.  As such, she can be considered for 
transition out of the hospital today.  She needs to complete a 5-day course of antibiotic and steroid
s.  She will need a 2 view chest x-ray in 2 weeks with follow up with her primary care physician at t
hat time.  If she has any persistence in infiltrate, pulmonary consultation should be arranged.

## 2018-11-12 NOTE — PDOC.PN
- Subjective


Encounter Start Date: 11/12/18


Encounter Start Time: 07:30





Patient seen and examined. No new complaints. No overnight events





- Objective


Resuscitation Status: 


 











Resuscitation Status           FULL:Full Resuscitation














MAR Reviewed: Yes


Vital Signs & Weight: 


 Vital Signs (12 hours)











  Temp Pulse Resp BP Pulse Ox


 


 11/12/18 07:39  97.9 F  79  18  148/69 H  96


 


 11/12/18 05:24  98 F  79  20  145/74 H  98


 


 11/12/18 00:43      96








 Weight











Admit Weight                   149 lb 9.6 oz


 


Weight                         149 lb 9.6 oz














I&O: 


 











 11/11/18 11/12/18 11/13/18





 06:59 06:59 06:59


 


Intake Total 1580  


 


Output Total 2100  


 


Balance -520  











Result Diagrams: 


 11/12/18 04:01





 11/12/18 04:01


Additional Labs: 


 Accuchecks











  11/12/18 11/11/18 11/11/18





  05:23 21:02 11:38


 


POC Glucose  110  137 H  189 H














Phys Exam





- Physical Examination


Constitutional: NAD


HEENT: PERRLA, moist MMs, sclera anicteric


Neck: no JVD, supple


Respiratory: no wheezing, no rales, no rhonchi


Cardiovascular: RRR, no significant murmur, no rub


Gastrointestinal: soft, non-tender, no distention, positive bowel sounds


Musculoskeletal: no edema, pulses present


Neurological: non-focal, normal sensation, moves all 4 limbs


Psychiatric: normal affect, A&O x 3


Skin: no rash, normal turgor





Dx/Plan


(1) Acute respiratory failure with hypoxia


Code(s): J96.01 - ACUTE RESPIRATORY FAILURE WITH HYPOXIA   Status: Acute   





(2) Lactic acidosis


Code(s): E87.2 - ACIDOSIS   Status: Resolved   Comment: due to sepsis   





(3) Multifocal pneumonia


Code(s): J18.9 - PNEUMONIA, UNSPECIFIED ORGANISM   Status: Acute   Comment: 

suspecting GPC and GNR   





(4) Sepsis with acute organ dysfunction


Code(s): A41.9 - SEPSIS, UNSPECIFIED ORGANISM; R65.20 - SEVERE SEPSIS WITHOUT 

SEPTIC SHOCK   Status: Acute   





(5) Thrombocytopenia


Code(s): D69.6 - THROMBOCYTOPENIA, UNSPECIFIED   Status: Acute   





(6) Arthritis, rheumatoid


Code(s): M06.9 - RHEUMATOID ARTHRITIS, UNSPECIFIED   Status: Chronic   





(7) Diabetes type 2, controlled


Code(s): E11.9 - TYPE 2 DIABETES MELLITUS WITHOUT COMPLICATIONS   Status: 

Chronic   





(8) Hypertension


Code(s): I10 - ESSENTIAL (PRIMARY) HYPERTENSION   Status: Chronic   





(9) Immunosuppressed status


Code(s): D89.9 - DISORDER INVOLVING THE IMMUNE MECHANISM, UNSPECIFIED   Status: 

Chronic   





- Plan


cont current plan of care, plan discussed w/ family, continue antibiotics





* medication reviewed as below


* symptomatic treatment


* see my discharge angel.


.








Review of Systems





- Review of Systems


ENT: negative: Ear Pain, Ear Discharge, Nose Pain, Nose Discharge, Nose 

Congestion, Mouth Pain, Mouth Swelling, Throat Pain, Throat Swelling, Other


Respiratory: negative: Cough, Dry, Shortness of Breath, Hemoptysis, SOB with 

Excertion, Pleuritic Pain, Sputum, Wheezing


Cardiovascular: negative: chest pain, palpitations, orthopnea, paroxysmal 

nocturnal dyspnea, edema, light headedness, other


Gastrointestinal: negative: Nausea, Vomiting, Abdominal Pain, Diarrhea, 

Constipation, Melena, Hematochezia, Other


Genitourinary: negative: Dysuria, Frequency, Incontinence, Hematuria, Retention

, Other


Musculoskeletal: negative: Neck Pain, Shoulder Pain, Arm Pain, Back Pain, Hand 

Pain, Leg Pain, Foot Pain, Other


Skin: negative: Rash, Lesions, Efrain, Bruising, Other





- Medications/Allergies


Allergies/Adverse Reactions: 


 Allergies











Allergy/AdvReac Type Severity Reaction Status Date / Time


 


No Known Allergies Allergy   Verified 11/09/18 21:04











Medications: 


 Current Medications





Acetaminophen (Tylenol)  650 mg PO Q4H PRN


   PRN Reason: Headache/Fever/Mild Pain (1-3)


Albuterol/Ipratropium (Duoneb)  3 ml NEB Q6H PRN


   PRN Reason: SOB &/or Wheezing


Artificial Tears (Tears Naturale)  2 drop EA EYE PRN PRN


   PRN Reason: Dry Eyes


Ascorbic Acid (Vitamin C)  500 mg PO DAILY [facility]


   Last Admin: 11/12/18 08:42 Dose:  500 mg


Aspirin (Ecotrin)  81 mg PO DAILY [facility]


   Last Admin: 11/12/18 08:41 Dose:  81 mg


Bisacodyl (Dulcolax)  10 mg PO DAILYPRN PRN


   PRN Reason: Constipation


Carvedilol (Coreg)  3.125 mg PO BID-[facility]


   Last Admin: 11/12/18 08:43 Dose:  3.125 mg


Cholecalciferol (Vitamin D3)  1,000 units PO DAILY [facility]


   Last Admin: 11/12/18 08:40 Dose:  1,000 units


Dextrose/Water (Dextrose 50%)  25 gm SLOW IVP PRN PRN


   PRN Reason: Hypoglycemia


Enoxaparin Sodium (Lovenox)  40 mg SC 0900 [facility]


   Last Admin: 11/11/18 08:17 Dose:  40 mg


Fish Oil (Fish Oil)  4,000 mg PO [facility]


   Last Admin: 11/12/18 08:47 Dose:  Not Given


Folic Acid (Folvite)  1 mg PO DAILY [facility]


   Last Admin: 11/12/18 08:41 Dose:  1 mg


Glucagon (Glucagon)  1 mg IM PRN PRN


   PRN Reason: Hypoglycemia


Guaifenesin (Robitussin Sf)  200 mg PO Q4H PRN


   PRN Reason: Cough


Hydralazine HCl (Apresoline)  10 mg SLOW IVP Q4H PRN


   PRN Reason: SBP > 180 and HR < 70


Levofloxacin 750 mg/ Device  150 mls @ 100 mls/hr IVPB Q24HR [facility]


   Last Admin: 11/11/18 21:02 Dose:  150 mls


Dextrose/Water (D5w)  1,000 mls @ 0 mls/hr IV .Q0M PRN


   PRN Reason: Hypoglycemia


Insulin Human Lispro (Humalog)  0 units SC .MILD SLIDING SCALE PRN


   PRN Reason: Mild Correctional Scale


   Last Admin: 11/11/18 17:06 Dose:  4 unit


Lisinopril (Zestril)  10 mg PO DAILY [facility]


   Last Admin: 11/12/18 08:44 Dose:  10 mg


Loperamide HCl (Imodium)  2 mg PO PRN PRN


   PRN Reason: Diarrhea/Loose Stools


Loratadine (Claritin)  10 mg PO DAILY [facility]


   Last Admin: 11/12/18 08:41 Dose:  10 mg


Metformin HCl (Glucophage)  1,000 mg PO BID-[facility]


   Last Admin: 11/12/18 08:42 Dose:  1,000 mg


Metoclopramide HCl (Reglan)  5 mg IVP Q4H PRN


   PRN Reason: Nausea


Mineral Oil/White Petrolatum (Eucerin Cream)  0 gm TOP BIDPRN PRN


   PRN Reason: Dry Skin


Prednisone (Prednisone)  40 mg PO [facility]


   Last Admin: 11/12/18 08:40 Dose:  40 mg


Senna/Docusate Sodium (Senokot S)  2 tab PO BID PRN


   PRN Reason: Constipation


Sodium Chloride (Ocean Nasal Spray 0.65%)  0 ml EA NARE QIDPRN PRN


   PRN Reason: Nasal Congestion


Throat Lozenges (Cepastat Lozenges)  1 piyush PO Q2H PRN


   PRN Reason: Sore Throat

## 2018-11-12 NOTE — DIS
DATE OF ADMISSION:  11/09/2018

 

DATE OF DISCHARGE:  11/12/2018

 

PRIMARY CARE PHYSICIAN:  Dr. Steph Ricketts.

 

DISCHARGE DISPOSITION:  Home.

 

PRIMARY DISCHARGE DIAGNOSES:  Acute respiratory failure with hypoxia, 
multifocal pneumonia suspecting from Streptococcal pneumonia, sepsis with acute 
organ dysfunction, thrombocytopenia due to sepsis, lactic acidosis due to 
sepsis.

 

SECONDARY DISCHARGE DIAGNOSES:  Immunosuppressed status, hypertension, diabetes 
type 2, rheumatoid arthritis.

 

PRIMARY PROCEDURE/OPERATION:  None.

 

RADIOLOGICAL INVESTIGATION:  Chest x-ray showed multifocal pneumonia and showed 
improvement.

 

SIGNIFICANT LABORATORY DATA:  WBC 10.8, hemoglobin 12.4, platelet 138.  Sodium 
137, potassium 4.0, BUN 16, creatinine 0.72, calcium 9.8, AST 51, ALT 19, 
alkaline phosphatase 54, BNP 85.  Blood culture negative.  Respiratory virus 
panel showed rhinovirus infection.

 

DISCHARGE MEDICATIONS:  Levaquin 750 mg p.o. daily for 7 days, prednisone 40 mg 
p.o. daily for 5 days and subsequently patient will continue her prednisone 7.5 
mg b.i.d., methotrexate as directed, metformin 1000 mg p.o. b.i.d., lisinopril 
10 mg daily, folic acid 1 mg daily, fish oil 4 capsules daily, vitamin D3 1000 
unit p.o. daily, cetirizine 10 mg daily, Coreg as directed, aspirin 81 mg daily
, vitamin C 500 mg p.o. daily, Enbrel 50 mg subcu every 7 days.

 

CONTRAINDICATIONS:  None.

 

CODE STATUS:  FULL CODE.

 

INPATIENT CONSULTANT:  Dr. Hunter was following while in hospital.

 

TEST RESULTS PENDING ON DISCHARGE:  None.

 

ALLERGIES:  No known drug allergy.

 

DISCHARGE PLAN:  Post hospital, patient will follow up with primary care 
physician in 1 week.  The patient will follow up with Dr. Hunter as instructed.

 

HOSPITAL COURSE:  A 63-year-old female with above-mentioned medical problem who 
was admitted by Dr. Pacheco on 11/09/2018.  Please see his H&P for further 
detail.  The patient was admitted for respiratory symptoms with cough, 
shortness of breath, and subjective feverish.  Patient was diagnosed with 
multifocal pneumonia based on chest x-ray which we suspected from Streptococcal 
pneumonia.  Patient required IMCU admission for her hypoxic respiratory failure 
on admission and she required BiPAP.  Subsequently, patient improved and BiPAP 
she did not require any further.  She was transferred to medical floor.  We 
continued with broad spectrum antibiotic therapy.  We changed to levofloxacin 
monotherapy.  Subsequently, she was also given steroid while in hospital as 
well as on discharge her home medication was continued while in hospital.  
Initially, she was requiring oxygen, but by the time of discharge, her oxygen 
requirement completely resolved.  She was ambulatory, tolerating p.o. well, and 
on room air.  Her chest x-ray also showed improvement.  Patient will follow up 
with primary care physician as instructed.  All new medication prescription 
sent to her pharmacy.

 

The patient is seen and examined at bedside today.  Please see my progress note 
from today for further details.



Total time spent on discharge day 31 minutes

 

MTDD

## 2018-11-12 NOTE — RAD
RADIOGRAPH CHEST 2 VIEWS:

 

Date: 11-12-18

Time: 10:02 a.m.

 

HISTORY: 

Follow up infiltrates in 63-year-old female.

 

COMPARISON: 

11-11-18

 

FINDINGS:

The previously demonstrated diffuse bilateral pulmonary densities have improved. Currently, there are
 mild interstitial residual densities in the lungs. Cardiac size is at the upper limits of normal. No
 pleural effusion or pneumothorax. 

 

IMPRESSION:

1. Interval improvement in the diffuse bilateral pulmonary densities, which could represent pulmonary
 edema. Multifocal pneumonia is the other, less likely, possibility. 

2. Continued follow up is recommended. 

 

 

NANCI 

 

POS: GERARDO

## 2019-01-29 ENCOUNTER — HOSPITAL ENCOUNTER (OUTPATIENT)
Dept: HOSPITAL 92 - SCSRAD | Age: 64
Discharge: HOME | End: 2019-01-29
Attending: INTERNAL MEDICINE
Payer: OTHER GOVERNMENT

## 2019-01-29 DIAGNOSIS — R06.00: Primary | ICD-10-CM

## 2019-01-29 PROCEDURE — 71046 X-RAY EXAM CHEST 2 VIEWS: CPT

## 2019-01-29 NOTE — RAD
PA AND LATERAL VIEWS OF CHEST:

 

Date:  01/29/19 

 

HISTORY:  

Dyspnea. 

 

FINDINGS:

 

Comparison made with exam of 11/12/18. 

 

The heart size is normal. The aorta is tortuous. The lungs are well expanded without focal areas of c
onsolidation, pneumothoraces, or pleural effusions. There are degenerative changes in the spine. 

 

IMPRESSION: 

No acute process. 

 

 

 

POS: C

## 2019-07-24 ENCOUNTER — HOSPITAL ENCOUNTER (OUTPATIENT)
Dept: HOSPITAL 92 - RAD | Age: 64
Discharge: HOME | End: 2019-07-24
Attending: INTERNAL MEDICINE
Payer: OTHER GOVERNMENT

## 2019-07-24 DIAGNOSIS — R06.00: Primary | ICD-10-CM

## 2019-07-24 PROCEDURE — 71046 X-RAY EXAM CHEST 2 VIEWS: CPT

## 2019-07-24 NOTE — RAD
EXAM:

Chest 2 views:



HISTORY:

Dyspnea



COMPARISON:

None.



FINDINGS:

There is a normal-sized cardiomediastinal silhouette. There is no evidence of consolidation, mass, or
 pleural effusion.   The bones are unremarkable. 





IMPRESSION:

No evidence of acute cardiopulmonary disease



Reported By: Irvin Benito 

Electronically Signed:  7/24/2019 10:38 AM

## 2019-10-08 ENCOUNTER — HOSPITAL ENCOUNTER (OUTPATIENT)
Dept: HOSPITAL 92 - BICMAMMO | Age: 64
Discharge: HOME | End: 2019-10-08
Attending: FAMILY MEDICINE
Payer: OTHER GOVERNMENT

## 2019-10-08 DIAGNOSIS — M85.852: ICD-10-CM

## 2019-10-08 DIAGNOSIS — M81.6: ICD-10-CM

## 2019-10-08 DIAGNOSIS — Z12.31: Primary | ICD-10-CM

## 2019-10-08 PROCEDURE — 77067 SCR MAMMO BI INCL CAD: CPT

## 2019-10-08 PROCEDURE — 77063 BREAST TOMOSYNTHESIS BI: CPT

## 2019-10-08 PROCEDURE — 77080 DXA BONE DENSITY AXIAL: CPT

## 2019-10-08 NOTE — BD
DEXA BONE DENSITY STUDY:

 

HISTORY:

Postmenopausal.

 

LUMBAR SPINE          BMD (g/cm2)     T-SCORE

L1                    0.889           -0.9

L2                    0.910           -1.1

L3                    0.981           -0.9

L4                    0.946           -1.0

TOTAL                 0.930           -1.0

 

LEFT FEMORAL NECK     0.700           -1.3

TOTAL                 0.879           -0.5

 

IMPRESSION:

1. Osteopenia of the left femoral neck.

 

2. Normal bone mineral density of the lumbar spine.

 

POS: GERARDO

## 2019-10-08 NOTE — MMO
Bilateral MAMMO Bilat Screen [facility].

 

CLINICAL HISTORY:

Patient is 64 years old and is seen for screening. The patient has no family

history of breast cancer.  The patient has no personal history of cancer.

 

VIEWS:

The views performed were:  bilateral craniocaudal with tomosynthesis and

bilateral mediolateral oblique with tomosynthesis.

 

FILMS COMPARED:

The present examination has been compared to prior imaging studies performed at

[facility] on 10/04/2011, 04/02/2014, 10/10/2016 and 07/06/2018.

 

This study has been interpreted with the assistance of computer-aided detection.

 

MAMMOGRAM FINDINGS:

There are scattered fibroglandular densities.

 

Benign calcifications are noted bilaterally.

 

There are no suspicious masses, suspicious calcifications, or new areas of

architectural distortion.

 

IMPRESSION:

THERE IS NO MAMMOGRAPHIC EVIDENCE OF MALIGNANCY.

 

A ROUTINE FOLLOW-UP MAMMOGRAM IN 1 YEAR IS RECOMMENDED.

 

THE RESULTS OF THIS EXAM WERE SENT TO THE PATIENT.

 

ACR BI-RADS Category 2 - Benign finding

 

MAMMOGRAPHY NOTE:

 1. A negative mammogram report should not delay a biopsy if a dominant of

 clinically suspicious mass is present.

 2. Approximately 10% to 15% of breast cancers are not detected by

 mammography.

 3. Adenosis and dense breasts may obscure an underlying neoplasm.

 

 

Reported by: RAMONA DENNIS MD

Electonically Signed: [id number]

## 2019-10-29 ENCOUNTER — HOSPITAL ENCOUNTER (OUTPATIENT)
Dept: HOSPITAL 92 - SCSRAD | Age: 64
Discharge: HOME | End: 2019-10-29
Attending: FAMILY MEDICINE
Payer: OTHER GOVERNMENT

## 2019-10-29 DIAGNOSIS — Z77.21: ICD-10-CM

## 2019-10-29 DIAGNOSIS — Z01.818: Primary | ICD-10-CM

## 2019-10-29 DIAGNOSIS — E11.65: ICD-10-CM

## 2019-10-29 DIAGNOSIS — R53.82: ICD-10-CM

## 2019-10-29 LAB
ALBUMIN SERPL BCG-MCNC: 4.4 G/DL (ref 3.4–4.8)
ALP SERPL-CCNC: 57 U/L (ref 40–110)
ALT SERPL W P-5'-P-CCNC: 23 U/L (ref 8–55)
ANION GAP SERPL CALC-SCNC: 15 MMOL/L (ref 10–20)
AST SERPL-CCNC: 17 U/L (ref 5–34)
BILIRUB SERPL-MCNC: 0.5 MG/DL (ref 0.2–1.2)
BUN SERPL-MCNC: 9 MG/DL (ref 9.8–20.1)
CALCIUM SERPL-MCNC: 10 MG/DL (ref 7.8–10.44)
CHLORIDE SERPL-SCNC: 100 MMOL/L (ref 98–107)
CO2 SERPL-SCNC: 26 MMOL/L (ref 23–31)
CREAT CL PREDICTED SERPL C-G-VRATE: 0 ML/MIN (ref 70–130)
GLOBULIN SER CALC-MCNC: 4.1 G/DL (ref 2.4–3.5)
GLUCOSE SERPL-MCNC: 128 MG/DL (ref 80–115)
HBCM INDEX: 0.05 S/CO (ref 0–0.79)
HBSAG INDEX: 0.18 S/CO (ref 0–0.99)
HEP A IGM S/CO: 0.12 S/CO (ref 0–0.79)
HEP C INDEX: 0.15 S/CO (ref 0–0.79)
HGB BLD-MCNC: 13.5 G/DL (ref 12–16)
MCH RBC QN AUTO: 27 PG (ref 27–31)
MCV RBC AUTO: 84.8 FL (ref 78–98)
MDIFF COMPLETE?: YES
PLATELET # BLD AUTO: 129 THOU/UL (ref 130–400)
POTASSIUM SERPL-SCNC: 3.6 MMOL/L (ref 3.5–5.1)
RBC # BLD AUTO: 5.02 MILL/UL (ref 4.2–5.4)
SODIUM SERPL-SCNC: 137 MMOL/L (ref 136–145)
TSH SERPL DL<=0.005 MIU/L-ACNC: 1.67 UIU/ML (ref 0.35–4.94)
WBC # BLD AUTO: 8.9 THOU/UL (ref 4.8–10.8)

## 2019-10-29 PROCEDURE — 80074 ACUTE HEPATITIS PANEL: CPT

## 2019-10-29 PROCEDURE — 80053 COMPREHEN METABOLIC PANEL: CPT

## 2019-10-29 PROCEDURE — 36415 COLL VENOUS BLD VENIPUNCTURE: CPT

## 2019-10-29 PROCEDURE — 85025 COMPLETE CBC W/AUTO DIFF WBC: CPT

## 2019-10-29 PROCEDURE — 71046 X-RAY EXAM CHEST 2 VIEWS: CPT

## 2019-10-29 PROCEDURE — 84443 ASSAY THYROID STIM HORMONE: CPT

## 2019-10-29 NOTE — RAD
Exam: Chest 2 views



HISTORY:Preop



Comparison: 1/29/2019



FINDINGS:



Lungs: No masses or consolidation.



Cardiac silhouette: Normal size

Vascular calcification is present.

Pulmonary vessels: Normal

Pleural Spaces: Clear

Pneumothorax: None



Osseous abnormalities: None of acuity.



IMPRESSION: No focal consolidation.





Reported By: Remy Horta 

Electronically Signed:  10/29/2019 3:59 PM

## 2020-11-10 ENCOUNTER — HOSPITAL ENCOUNTER (OUTPATIENT)
Dept: HOSPITAL 92 - BICMAMMO | Age: 65
Discharge: HOME | End: 2020-11-10
Attending: FAMILY MEDICINE
Payer: MEDICARE

## 2020-11-10 DIAGNOSIS — Z12.31: Primary | ICD-10-CM

## 2020-11-10 PROCEDURE — 77063 BREAST TOMOSYNTHESIS BI: CPT

## 2020-11-10 PROCEDURE — 77067 SCR MAMMO BI INCL CAD: CPT

## 2020-11-10 NOTE — MMO
Bilateral MAMMO Bilat Screen [facility].

 

CLINICAL HISTORY:

Patient is 65 years old and is seen for screening. The patient has no family

history of breast cancer.  The patient has no personal history of cancer.

 

VIEWS:

The views performed were:  bilateral craniocaudal with tomosynthesis and

bilateral mediolateral oblique with tomosynthesis.

 

FILMS COMPARED:

The present examination has been compared to prior imaging studies performed at

[facility] on 04/02/2014, 10/10/2016, 07/06/2018 and 10/08/2019.

 

This study has been interpreted with the assistance of computer-aided detection.

 

MAMMOGRAM FINDINGS:

There are scattered fibroglandular densities.

 

There are stable benign appearing calcifications seen in both breasts.

 

There are no suspicious masses, suspicious calcifications, or new areas of

architectural distortion.

 

IMPRESSION:

THERE IS NO MAMMOGRAPHIC EVIDENCE OF MALIGNANCY.

 

A ROUTINE FOLLOW-UP MAMMOGRAM IN 1 YEAR IS RECOMMENDED.

 

THE RESULTS OF THIS EXAM WERE SENT TO THE PATIENT.

 

ACR BI-RADS Category 2 - Benign finding

 

MAMMOGRAPHY NOTE:

 1. A negative mammogram report should not delay a biopsy if a dominant of

 clinically suspicious mass is present.

 2. Approximately 10% to 15% of breast cancers are not detected by

 mammography.

 3. Adenosis and dense breasts may obscure an underlying neoplasm.

 

 

Reported by: KITTY MCKENZIE MD

Electonically Signed: [id number]